# Patient Record
Sex: FEMALE | Race: WHITE
[De-identification: names, ages, dates, MRNs, and addresses within clinical notes are randomized per-mention and may not be internally consistent; named-entity substitution may affect disease eponyms.]

---

## 2021-02-05 ENCOUNTER — HOSPITAL ENCOUNTER (EMERGENCY)
Dept: HOSPITAL 75 - ER | Age: 21
Discharge: HOME | End: 2021-02-05
Payer: COMMERCIAL

## 2021-02-05 VITALS — DIASTOLIC BLOOD PRESSURE: 87 MMHG | SYSTOLIC BLOOD PRESSURE: 151 MMHG

## 2021-02-05 VITALS — WEIGHT: 218.26 LBS | HEIGHT: 67.72 IN | BODY MASS INDEX: 33.46 KG/M2

## 2021-02-05 DIAGNOSIS — Z90.49: ICD-10-CM

## 2021-02-05 DIAGNOSIS — Z88.1: ICD-10-CM

## 2021-02-05 DIAGNOSIS — R10.9: Primary | ICD-10-CM

## 2021-02-05 LAB
ALBUMIN SERPL-MCNC: 4.5 GM/DL (ref 3.2–4.5)
ALP SERPL-CCNC: 52 U/L (ref 40–136)
ALT SERPL-CCNC: 25 U/L (ref 0–55)
APTT PPP: YELLOW S
BACTERIA #/AREA URNS HPF: (no result) /HPF
BASOPHILS # BLD AUTO: 0 10^3/UL (ref 0–0.1)
BASOPHILS NFR BLD AUTO: 0 % (ref 0–10)
BILIRUB SERPL-MCNC: 0.6 MG/DL (ref 0.1–1)
BILIRUB UR QL STRIP: NEGATIVE
BUN/CREAT SERPL: 18
CALCIUM SERPL-MCNC: 9.2 MG/DL (ref 8.5–10.1)
CHLORIDE SERPL-SCNC: 108 MMOL/L (ref 98–107)
CO2 SERPL-SCNC: 21 MMOL/L (ref 21–32)
CREAT SERPL-MCNC: 0.68 MG/DL (ref 0.6–1.3)
EOSINOPHIL # BLD AUTO: 0.1 10^3/UL (ref 0–0.3)
EOSINOPHIL NFR BLD AUTO: 1 % (ref 0–10)
FIBRINOGEN PPP-MCNC: CLEAR MG/DL
GFR SERPLBLD BASED ON 1.73 SQ M-ARVRAT: > 60 ML/MIN
GLUCOSE SERPL-MCNC: 88 MG/DL (ref 70–105)
GLUCOSE UR STRIP-MCNC: NEGATIVE MG/DL
HCT VFR BLD CALC: 41 % (ref 35–52)
HGB BLD-MCNC: 14 G/DL (ref 11.5–16)
KETONES UR QL STRIP: NEGATIVE
LEUKOCYTE ESTERASE UR QL STRIP: (no result)
LYMPHOCYTES # BLD AUTO: 2.1 10^3/UL (ref 1–4)
LYMPHOCYTES NFR BLD AUTO: 31 % (ref 12–44)
MANUAL DIFFERENTIAL PERFORMED BLD QL: NO
MCH RBC QN AUTO: 30 PG (ref 25–34)
MCHC RBC AUTO-ENTMCNC: 34 G/DL (ref 32–36)
MCV RBC AUTO: 88 FL (ref 80–99)
MONOCYTES # BLD AUTO: 0.5 10^3/UL (ref 0–1)
MONOCYTES NFR BLD AUTO: 7 % (ref 0–12)
NEUTROPHILS # BLD AUTO: 3.9 10^3/UL (ref 1.8–7.8)
NEUTROPHILS NFR BLD AUTO: 60 % (ref 42–75)
NITRITE UR QL STRIP: NEGATIVE
PH UR STRIP: 8 [PH] (ref 5–9)
PLATELET # BLD: 225 10^3/UL (ref 130–400)
PMV BLD AUTO: 9.9 FL (ref 9–12.2)
POTASSIUM SERPL-SCNC: 4.1 MMOL/L (ref 3.6–5)
PROT SERPL-MCNC: 7.8 GM/DL (ref 6.4–8.2)
PROT UR QL STRIP: NEGATIVE
RBC #/AREA URNS HPF: (no result) /HPF
SODIUM SERPL-SCNC: 138 MMOL/L (ref 135–145)
SP GR UR STRIP: 1.01 (ref 1.02–1.02)
SQUAMOUS #/AREA URNS HPF: (no result) /HPF
WBC # BLD AUTO: 6.6 10^3/UL (ref 4.3–11)
WBC #/AREA URNS HPF: (no result) /HPF

## 2021-02-05 PROCEDURE — 80053 COMPREHEN METABOLIC PANEL: CPT

## 2021-02-05 PROCEDURE — 81000 URINALYSIS NONAUTO W/SCOPE: CPT

## 2021-02-05 PROCEDURE — 85025 COMPLETE CBC W/AUTO DIFF WBC: CPT

## 2021-02-05 PROCEDURE — 84703 CHORIONIC GONADOTROPIN ASSAY: CPT

## 2021-02-05 PROCEDURE — 87088 URINE BACTERIA CULTURE: CPT

## 2021-02-05 PROCEDURE — 36415 COLL VENOUS BLD VENIPUNCTURE: CPT

## 2021-02-05 PROCEDURE — 86141 C-REACTIVE PROTEIN HS: CPT

## 2021-02-05 PROCEDURE — 74178 CT ABD&PLV WO CNTR FLWD CNTR: CPT

## 2021-02-05 NOTE — ED ABDOMINAL PAIN
General


Chief Complaint:  Abdominal/GI Problems


Stated Complaint:  RLQ PAIN


Source of Information:  Patient


Exam Limitations:  No Limitations





History of Present Illness


Date Seen by Provider:  2021


Time Seen by Provider:  12:18


Initial Comments


To ER with reports of right flank pain for about 24 to 36 hours associated with 

intermittent nausea.  No dysuria.  History of cholecystectomy and appendectomy. 

No cough no shortness of breath no fevers no chills.


Timing/Duration:  1-2 Days


Severity/Quality:  Moderate


Location:  Flank


Radiation:  No Radiation


Activities at Onset:  None


Associated Symptoms:  Denies Symptoms





Allergies and Home Medications


Allergies


Coded Allergies:  


     ciprofloxacin (Verified  Allergy, Severe, EDEMA, 21)





Home Medications


No Active Prescriptions or Reported Meds





Patient Home Medication List


Home Medication List Reviewed:  Yes





Review of Systems


Review of Systems


Constitutional:  see HPI


EENTM:  No Symptoms Reported


Respiratory:  No Symptoms Reported


Cardiovascular:  No Symptoms Reported


Gastrointestinal:  No Symptoms Reported


Genitourinary:  See HPI, Flank Pain


Musculoskeletal:  no symptoms reported


Skin:  no symptoms reported


Psychiatric/Neurological:  No Symptoms Reported


Endocrine:  No Symptoms Reported


Hematologic/Lymphatic:  No Symptoms Reported





Physical Exam


Vital Signs





Vital Signs - First Documented








 21





 12:00


 


Temp 37.0


 


Pulse 67


 


Resp 16


 


B/P (MAP) 151/87 (108)


 


Pulse Ox 97


 


O2 Delivery Room Air





Capillary Refill :


Height/Weight/BMI


Height: '"


Weight: lbs. oz. kg;  BMI


Method:


General Appearance:  WD/WN, no apparent distress


HEENT:  PERRL/EOMI, normal ENT inspection


Respiratory:  no respiratory distress, no accessory muscle use


Cardiovascular:  regular rate, rhythm, no murmur


Gastrointestinal:  normal bowel sounds, non tender, soft


Extremities:  normal range of motion, non-tender


Back:  CVA tenderness (R)


Neurologic/Psychiatric:  alert, normal mood/affect, oriented x 3


Skin:  normal color, warm/dry





Progress/Results/Core Measures


Results/Orders


Lab Results





Laboratory Tests








Test


 21


12:15 Range/Units


 


 


White Blood Count


 6.6 


 4.3-11.0


10^3/uL


 


Red Blood Count


 4.66 


 3.80-5.11


10^6/uL


 


Hemoglobin 14.0  11.5-16.0  g/dL


 


Hematocrit 41  35-52  %


 


Mean Corpuscular Volume 88  80-99  fL


 


Mean Corpuscular Hemoglobin 30  25-34  pg


 


Mean Corpuscular Hemoglobin


Concent 34 


 32-36  g/dL





 


Red Cell Distribution Width 12.0  10.0-14.5  %


 


Platelet Count


 225 


 130-400


10^3/uL


 


Mean Platelet Volume 9.9  9.0-12.2  fL


 


Immature Granulocyte % (Auto) 0   %


 


Neutrophils (%) (Auto) 60  42-75  %


 


Lymphocytes (%) (Auto) 31  12-44  %


 


Monocytes (%) (Auto) 7  0-12  %


 


Eosinophils (%) (Auto) 1  0-10  %


 


Basophils (%) (Auto) 0  0-10  %


 


Neutrophils # (Auto)


 3.9 


 1.8-7.8


10^3/uL


 


Lymphocytes # (Auto)


 2.1 


 1.0-4.0


10^3/uL


 


Monocytes # (Auto)


 0.5 


 0.0-1.0


10^3/uL


 


Eosinophils # (Auto)


 0.1 


 0.0-0.3


10^3/uL


 


Basophils # (Auto)


 0.0 


 0.0-0.1


10^3/uL


 


Immature Granulocyte # (Auto)


 0.0 


 0.0-0.1


10^3/uL


 


Urine Color YELLOW   


 


Urine Clarity CLEAR   


 


Urine pH 8.0  5-9  


 


Urine Specific Gravity 1.015 L 1.016-1.022  


 


Urine Protein NEGATIVE  NEGATIVE  


 


Urine Glucose (UA) NEGATIVE  NEGATIVE  


 


Urine Ketones NEGATIVE  NEGATIVE  


 


Urine Nitrite NEGATIVE  NEGATIVE  


 


Urine Bilirubin NEGATIVE  NEGATIVE  


 


Urine Urobilinogen 0.2  < = 1.0  MG/DL


 


Urine Leukocyte Esterase TRACE H NEGATIVE  


 


Urine RBC (Auto) NEGATIVE  NEGATIVE  


 


Urine RBC NONE   /HPF


 


Urine WBC 2-5   /HPF


 


Urine Squamous Epithelial


Cells 0-2 


  /HPF





 


Urine Crystals NONE   /LPF


 


Urine Bacteria TRACE   /HPF


 


Urine Casts NONE   /LPF


 


Urine Mucus NEGATIVE   /LPF


 


Urine Culture Indicated YES   


 


Sodium Level 138  135-145  MMOL/L


 


Potassium Level 4.1  3.6-5.0  MMOL/L


 


Chloride Level 108 H   MMOL/L


 


Carbon Dioxide Level 21  21-32  MMOL/L


 


Anion Gap 9  5-14  MMOL/L


 


Blood Urea Nitrogen 12  7-18  MG/DL


 


Creatinine


 0.68 


 0.60-1.30


MG/DL


 


Estimat Glomerular Filtration


Rate > 60 


  





 


BUN/Creatinine Ratio 18   


 


Glucose Level 88    MG/DL


 


Calcium Level 9.2  8.5-10.1  MG/DL


 


Corrected Calcium 8.8  8.5-10.1  MG/DL


 


Total Bilirubin 0.6  0.1-1.0  MG/DL


 


Aspartate Amino Transf


(AST/SGOT) 24 


 5-34  U/L





 


Alanine Aminotransferase


(ALT/SGPT) 25 


 0-55  U/L





 


Alkaline Phosphatase 52    U/L


 


C-Reactive Protein High


Sensitivity 0.07 


 0.00-0.50


MG/DL


 


Total Protein 7.8  6.4-8.2  GM/DL


 


Albumin 4.5  3.2-4.5  GM/DL


 


Serum Pregnancy Test,


Qualitative NEGATIVE 


 NEGATIVE  











My Orders





Orders - KESHA LUNA


Ua Culture If Indicated (21 11:58)


Cbc With Automated Diff (21 11:58)


Comprehensive Metabolic Panel (21 11:58)


Hs C Reactive Protein (21 11:58)


Ed Iv/Invasive Line Start (21 11:58)


Hcg,Qualitative Serum (21 11:58)


Ct Abdomen/Pelvis W Wo (21 12:09)


Ketorolac Injection (Toradol Injection) (21 12:15)


Ondansetron Injection (Zofran Injectio (21 12:15)


Ns Iv 1000 Ml (Sodium Chloride 0.9%) (21 12:15)


Iohexol Injection (Omnipaque 350 Mg/Ml 1 (21 12:45)


Received Contrast (Hold Metformin- Contr (21 12:45)


Sodium Chloride Flush (Catheter Flush Sy (21 12:45)


Ns (Ivpb) (Sodium Chloride 0.9% Ivpb Bag (21 12:45)


Urine Culture (21 12:15)





Medications Given in ED





Current Medications








 Medications  Dose


 Ordered  Sig/Candy


 Route  Start Time


 Stop Time Status Last Admin


Dose Admin


 


 Iohexol  100 ml  ONCE  ONCE


 IV  21 12:45


 21 12:46 DC 21 13:00


100 ML


 


 Ketorolac


 Tromethamine  15 mg  ONCE  ONCE


 IVP  21 12:15


 21 12:16 DC 21 12:44


15 MG


 


 Ondansetron HCl  4 mg  ONCE  ONCE


 IVP  21 12:15


 21 12:16 DC 21 12:40


4 MG


 


 Sodium Chloride  10 ml  AS NEEDED  PRN


 IV  21 12:45


    21 13:00


10 ML


 


 Sodium Chloride  100 ml  ONCE  ONCE


 IV  21 12:45


 21 12:46 DC 21 13:00


80 ML








Vital Signs/I&O











 21





 12:00


 


Temp 37.0


 


Pulse 67


 


Resp 16


 


B/P (MAP) 151/87 (108)


 


Pulse Ox 97


 


O2 Delivery Room Air











Diagnostic Imaging





   Diagonstic Imaging:  CT


Comments


NAME:   FAB CASTELLANOS


MED REC#:   [id number]


ACCOUNT#:   [id number]


PT STATUS:   REG ER


:   2000


PHYSICIAN:   KESHA LUNA


ADMIT DATE:   21/ER


                                   ***Draft***


Date of Exam:21





CT ABDOMEN/PELVIS W WO








PROCEDURE: CT abdomen and pelvis with and without contrast.





TECHNIQUE: Precontrast acquisitions were acquired through the


abdomen and pelvis. Multiple contiguous axial images were


obtained through the abdomen and pelvis after the administration


of intravenous contrast. Auto Exposure Controls were utilized


during the CT exam to meet ALARA standards for radiation dose


reduction. 





INDICATION:  Right posterior pain radiating to the front.





No prior studies are available for comparison.





Lung bases are clear. No discrete liver mass is detected. Bladder


surgically absent. No biliary ductal dilatation is seen. Pancreas


is unremarkable. Spleen is enlarged measuring up to 14.7 cm. No


adrenal mass is detected. Kidneys are unremarkable. No definite


renal calculi or evidence of hydronephrosis is identified. Aorta


is non-aneurysmal. Small and large bowel loops are normal


caliber. There is no obstruction. No free fluid or fluid


collection is seen. The uterus and ovaries are unremarkable. The


bladder is unremarkable. No abdominal or pelvic lymphadenopathy


is identified. Bony structures appear nonacute.





IMPRESSION: Unremarkable pre and postcontrast CT of the abdomen


and pelvis.




















  Dictated on workstation # [id number]








Dict:   21 [id number]


Trans:   21 1322


CVB [id number]





Interpreted by:     SOCO VAZQUEZ MD


Electronically signed by:





Departure


Communication (Admissions)


CT is unremarkable.  Labs are unremarkable.  Urine is unremarkable.  This may 

simply be musculoskeletal.  I will give her a course of NSAIDs plus muscle 

relaxers.





Impression





   Primary Impression:  


   Right flank pain


Disposition:   HOME, SELF-CARE


Condition:  Stable





Departure-Patient Inst.


Decision time for Depature:  13:22


Referrals:  


NO,LOCAL PHYSICIAN (PCP/Family)


Primary Care Physician


Patient Instructions:  No Instuctions Given





Add. Discharge Instructions:  


   1.  Return to ER for any concerns


   2.  Follow-up with your doctor next week


   3.





All discharge instructions reviewed with patient and/or family. Voiced 

understanding.


Scripts


Naproxen (Naprosyn) 500 Mg Tablet


500 MG PO BID PRN for PAIN-MODERATE (5-7), #30 TAB 0 Refills


   Prov: KESHA LUNA         21 


Methocarbamol (Robaxin-750) 750 Mg Tablet


750 MG PO Q4H PRN for PAIN-MODERATE (5-7), #14 TAB


   Prov: KESHA LUNA         21


Work/School Note:  Work Release Form   Date Seen in the Emergency Department:  

2021


   Return to Work:  2021











KESHA LUNA              2021 12:20

## 2021-02-05 NOTE — DIAGNOSTIC IMAGING REPORT
PROCEDURE: CT abdomen and pelvis with and without contrast.



TECHNIQUE: Precontrast acquisitions were acquired through the

abdomen and pelvis. Multiple contiguous axial images were

obtained through the abdomen and pelvis after the administration

of intravenous contrast. Auto Exposure Controls were utilized

during the CT exam to meet ALARA standards for radiation dose

reduction. 



INDICATION:  Right posterior pain radiating to the front.



No prior studies are available for comparison.



Lung bases are clear. No discrete liver mass is detected. Bladder

surgically absent. No biliary ductal dilatation is seen. Pancreas

is unremarkable. Spleen is enlarged measuring up to 14.7 cm. No

adrenal mass is detected. Kidneys are unremarkable. No definite

renal calculi or evidence of hydronephrosis is identified. Aorta

is non-aneurysmal. Small and large bowel loops are normal

caliber. There is no obstruction. No free fluid or fluid

collection is seen. The uterus and ovaries are unremarkable. The

bladder is unremarkable. No abdominal or pelvic lymphadenopathy

is identified. Bony structures appear nonacute.



IMPRESSION: Unremarkable pre and postcontrast CT of the abdomen

and pelvis.











Dictated by: 



  Dictated on workstation # [id number]